# Patient Record
Sex: FEMALE | Race: WHITE
[De-identification: names, ages, dates, MRNs, and addresses within clinical notes are randomized per-mention and may not be internally consistent; named-entity substitution may affect disease eponyms.]

---

## 2018-06-26 ENCOUNTER — HOSPITAL ENCOUNTER (OUTPATIENT)
Dept: HOSPITAL 76 - DI | Age: 58
Discharge: HOME | End: 2018-06-26
Attending: OBSTETRICS & GYNECOLOGY
Payer: SELF-PAY

## 2018-06-26 DIAGNOSIS — Z12.31: Primary | ICD-10-CM

## 2018-06-26 PROCEDURE — 77067 SCR MAMMO BI INCL CAD: CPT

## 2018-06-28 NOTE — MAMMOGRAPHY REPORT
Procedure Date:  06/26/2018   

Accession Number:  [id number] / [id number]                    

Procedure:  MIKY - Screening Mammo Dig Bilat CPT Code:  

 

FULL RESULT:

 

 

EXAM: Screening Mammo Dig Bilat

 

DATE: 6/26/2018 11:10 AM

 

CLINICAL HISTORY: 58-year-old for screening

 

TECHNIQUE: Bilateral CC and MLO views were obtained.

 

COMPARISON: 7/7/2016, 7/8/2015, 7/1/2014, 6/18/2013, 4/16/2012, 4/8/2011, 

4/2/2010

 

FINDINGS:

The breasts demonstrate scattered fibroglandular densities bilaterally. 

Punctate, typically benign calcifications are present. No suspicious 

masses, clustered microcalcifications, or regions of architectural 

distortion are identified.

 

IMPRESSION: Benign findings

 

RECOMMENDATION: Routine annual screening unless otherwise clinically 

indicated.

 

BIRADS CATEGORY 2: Benign findings

 

STANDARD QUALIFYING STATEMENTS:

1.  This examination was reviewed with the aid of Computer-Aided 

Detection (CAD).

2.  A negative or benign  imaging report should not delay biopsy if 

clinically suspicious findings are present.  Consider surgical 

consultation if warrented.  More than 5% of cancers are not identified by 

imaging.

3.  Dense breasts may obscure an underlying neoplasm.

## 2018-07-10 ENCOUNTER — HOSPITAL ENCOUNTER (OUTPATIENT)
Dept: HOSPITAL 76 - SDS | Age: 58
Discharge: HOME | End: 2018-07-10
Attending: SURGERY
Payer: SELF-PAY

## 2018-07-10 VITALS — SYSTOLIC BLOOD PRESSURE: 102 MMHG | DIASTOLIC BLOOD PRESSURE: 62 MMHG

## 2018-07-10 DIAGNOSIS — K90.0: ICD-10-CM

## 2018-07-10 DIAGNOSIS — Z12.11: Primary | ICD-10-CM

## 2018-07-10 DIAGNOSIS — D12.2: ICD-10-CM

## 2018-07-10 PROCEDURE — 45380 COLONOSCOPY AND BIOPSY: CPT

## 2018-07-10 PROCEDURE — 0DBK8ZZ EXCISION OF ASCENDING COLON, VIA NATURAL OR ARTIFICIAL OPENING ENDOSCOPIC: ICD-10-PCS | Performed by: SURGERY

## 2020-01-03 ENCOUNTER — HOSPITAL ENCOUNTER (OUTPATIENT)
Dept: HOSPITAL 76 - DI | Age: 60
Discharge: HOME | End: 2020-01-03
Attending: GENERAL ACUTE CARE HOSPITAL
Payer: SELF-PAY

## 2020-01-03 DIAGNOSIS — Z12.31: Primary | ICD-10-CM

## 2020-01-03 DIAGNOSIS — Z80.3: ICD-10-CM

## 2020-01-03 PROCEDURE — 77067 SCR MAMMO BI INCL CAD: CPT

## 2020-01-08 NOTE — MAMMOGRAPHY REPORT
Reason:  ROUTINE SCREENING

Procedure Date:  01/03/2020   

Accession Number:  [id number] / [id number]                    

Procedure:  MIKY - Screening Mammo Dig Bilat CPT Code:  

 

***Final Report***

 

 

FULL RESULT:

 

 

EXAM: Screening Mammo Dig Bilat

 

DATE: 1/3/2020 4:26 PM

 

CLINICAL HISTORY:  The patient is an asymptomatic 59-year-old female. 

Second degree family history of breast cancer.

 

TECHNIQUE: (B) - Bilateral  CC, laterally exaggerated CC, MLO views were 

obtained.

 

COMPARISON: 6/26/2018, 7/2/2016, 7/8/2015, 7/1/2014, 6/18/2013 4/16/2012

 

PARENCHYMAL PATTERN: (A) - The breasts demonstrate scattered 

fibroglandular densities bilaterally.

 

FINDINGS:

The pattern of asymmetry is stable given positional variation and 

interval involution. There are no suspicious masses, calcifications, or 

areas of distortion.

 

IMPRESSION: Negative examination. BI-RADS category 1.

 

RECOMMENDATION: (ANNUAL)  - Recommend routine annual screening 

mammography.

 

BI-RADS CATEGORY: (1) - Negative.

 

STANDARD QUALIFYING STATEMENTS:

1.  This examination was not reviewed with the aid of Computer-Aided 

Detection (CAD).

2.  A negative or benign  imaging report should not preclude biopsy if 

clinically suspicious findings are present.

3.  Dense breasts may obscure an underlying neoplasm.

4. This examination was reviewed the aid of 3D breast imaging 

(tomosynthesis).

## 2021-01-19 ENCOUNTER — HOSPITAL ENCOUNTER (OUTPATIENT)
Dept: HOSPITAL 76 - DI.N | Age: 61
Discharge: HOME | End: 2021-01-19
Attending: OBSTETRICS & GYNECOLOGY
Payer: SELF-PAY

## 2021-01-19 DIAGNOSIS — Z12.31: Primary | ICD-10-CM

## 2021-01-20 NOTE — MAMMOGRAPHY REPORT
BILATERAL DIGITAL SCREENING MAMMOGRAM 3D/2D: 1/19/2021

 

CLINICAL: Routine screening.  

 

Comparison is made to exams dated:  1/3/2020 mammogram, 6/26/2018 mammogram, and 7/7/2016 mammogram -
 [facility].  The tissue of both breasts is heterogeneously dense. This may lower t
he sensitivity of mammography.  

 

No significant masses, calcifications, or other findings are seen in either breast.  

There has been no significant interval change.

 

IMPRESSION: NEGATIVE

There is no mammographic evidence of malignancy. A 1 year screening mammogram is recommended.  

 

 

This exam was interpreted at Station ID: [id number].  

 

NOTE: For mammograms, a report in lay terms will be sent to the patient. Approximately 15% of breast 
malignancies will not be visualized mammographically. In the management of a palpable breast mass, a 
negative mammogram must not discourage biopsy of a clinically suspicious lesion.

 

Electronically Signed By: Aung Sahni M.D.          

ar/rigoberto:1/19/2021 11:57:04  

 

 

 

ACR BI-RADS Category 1: Negative 3341F

PARENCHYMAL PATTERN: (D) - The breast(s) demonstrate(s) heterogeneously dense fibroglandular tianna lackey.

BI-RADS CATEGORY: (1) - 1

RECOMMENDATION: (ANNUAL)  - Recommend routine annual screening mammography.

[id number]

1 year screening

LATERALITY: (B)

## 2021-01-22 ENCOUNTER — HOSPITAL ENCOUNTER (OUTPATIENT)
Dept: HOSPITAL 76 - DI | Age: 61
Discharge: HOME | End: 2021-01-22
Attending: OBSTETRICS & GYNECOLOGY
Payer: SELF-PAY

## 2021-01-22 DIAGNOSIS — Z13.820: Primary | ICD-10-CM

## 2021-01-22 DIAGNOSIS — M85.89: ICD-10-CM

## 2021-01-22 NOTE — DEXA REPORT
PROCEDURE: Dexa Spine and/or Hip

 

INDICATIONS: SCREENING FOR OSTEOPOROSIS

 

TECHNIQUE: Dual energy x-ray absorptiometry (DXA) was performed on a [email or web address] System.  Regions measur
ed are the AP Spine, femoral neck, and if needed forearm.

 

COMPARISON: None.

  

FINDINGS:

 

Lumbar Spine: 

Bone Mineral Density   0.997 g/cm/cm,T score  -1.5, mild osteopenia

 

Left Hip:

Bone Mineral Density  0.727 g/cm/cm,T score -2.2, severe osteopenia

 

Left Femoral Neck:

Bone Mineral Density  0.766 g/cm/cm, T score -2.0, moderate to severe osteopenia

 

(T score greater or equal to -1.0: NORMAL)

(T score from -1.1 to -2.4: OSTEOPENIA)

(T score less than or equal to -2.5 to: OSTEOPOROSIS)

 

Impression: Osteopenia most severe in the left hip as above.

 

Patients with diagnosis of osteoporosis or osteopenia should have regular bone mineral density assess
ment.  For those eligible for Medicare, routine testing is allowed once every 2 years.  Testing frequ
ency can be increased for patients who have rapidly progressing disease or for those who are receivin
g medical therapy to restore bone mass.

 

Reviewed by: Mandy Osborne MD on 1/22/2021 2:38 PM PST

Approved by: Mandy Osborne MD on 1/22/2021 2:38 PM PST

 

 

Station ID:  [id number]

## 2022-05-02 ENCOUNTER — HOSPITAL ENCOUNTER (OUTPATIENT)
Dept: HOSPITAL 76 - LAB.N | Age: 62
Discharge: HOME | End: 2022-05-02
Attending: PHYSICIAN ASSISTANT
Payer: SELF-PAY

## 2022-05-02 DIAGNOSIS — J34.89: Primary | ICD-10-CM

## 2022-05-02 DIAGNOSIS — Z20.822: ICD-10-CM

## 2022-10-20 ENCOUNTER — HOSPITAL ENCOUNTER (OUTPATIENT)
Dept: HOSPITAL 76 - DI.N | Age: 62
Discharge: HOME | End: 2022-10-20
Attending: NURSE PRACTITIONER
Payer: SELF-PAY

## 2022-10-20 DIAGNOSIS — Z12.31: Primary | ICD-10-CM

## 2022-10-21 NOTE — MAMMOGRAPHY REPORT
BILATERAL DIGITAL SCREENING MAMMOGRAM 3D/2D: 10/20/2022

 

CLINICAL: Routine screening.  

 

Comparison is made to exams dated:  1/19/2021 mammogram, 1/3/2020 mammogram, 6/26/2018 mammogram, 7/7
/2016 mammogram, 7/8/2015 mammogram, and 7/1/2014 mammogram - [facility].  

 

There are scattered areas of fibroglandular density in both breasts (category b / 25%-50% glandular t
issue).  

 

No significant masses, calcifications, or other findings are seen in either breast.  

There has been no significant interval change.

 

IMPRESSION: NEGATIVE

There is no mammographic evidence of malignancy. A 1 year screening mammogram is recommended.  

 

Based on the Tyrer Cuzick model (a risk assessment model) the patients lifetime risk is 8.3% and her
 10 year risk is 3.8%. According to the ACR, ACS, and NCCN guidelines, an annual breast MRI exam loki
g with mammogram is recommended if the patients lifetime risk is 20% or greater.

 

 

This exam was interpreted at Station ID: [id number].  

 

NOTE: For mammograms, a report in lay terms will be sent to the patient. Approximately 15% of breast 
malignancies will not be visualized mammographically. In the management of a palpable breast mass, a 
negative mammogram must not discourage biopsy of a clinically suspicious lesion.

 

Electronically Signed By: Peri cunningham/rigoberto:10/20/2022 21:34:44  

 

 

 

ACR BI-RADS Category 1: Negative 3341F

PARENCHYMAL PATTERN: (A) - The breast(s) demonstrate(s) scattered fibroglandular densities.

BI-RADS CATEGORY: (1) - 1

RECOMMENDATION: (ANNUAL)  - Recommend routine annual screening mammography.

[id number]

1 year screening

LATERALITY: (B)

## 2023-10-24 ENCOUNTER — HOSPITAL ENCOUNTER (OUTPATIENT)
Dept: HOSPITAL 76 - DI.N | Age: 63
Discharge: HOME | End: 2023-10-24
Attending: NURSE PRACTITIONER
Payer: SELF-PAY

## 2023-10-24 DIAGNOSIS — R92.323: ICD-10-CM

## 2023-10-24 DIAGNOSIS — Z12.31: Primary | ICD-10-CM

## 2023-10-25 NOTE — MAMMOGRAPHY REPORT
BILATERAL DIGITAL SCREENING MAMMOGRAM 3D/2D: 10/24/2023

 

CLINICAL: Routine screening.  

 

Comparison is made to exams dated:  10/20/2022 mammogram, 1/19/2021 mammogram, 1/3/2020 mammogram, 6/
26/2018 mammogram, 7/7/2016 mammogram, and 7/8/2015 mammogram - [facility].  

 

There are scattered areas of fibroglandular density in both breasts (category b / 25%-50% glandular t
issue).  

 

There is a benign focal asymmetry in the right breast.  

No significant masses, calcifications, or other findings are seen in either breast.  

There has been no significant interval change.

 

IMPRESSION: BENIGN

There is no mammographic evidence of malignancy. A 1 year screening mammogram is recommended.  

 

Based on the Tyrer Cuzick model (a risk assessment model) the patients lifetime risk is 7.9% and her
 10 year risk is 3.8%. According to the ACR, ACS, and NCCN guidelines, an annual breast MRI exam loki
g with mammogram is recommended if the patients lifetime risk is 20% or greater.

 

 

This exam was interpreted at Station ID: [id number].  

 

NOTE: For mammograms, a report in lay terms will be sent to the patient. Approximately 15% of breast 
malignancies will not be visualized mammographically. In the management of a palpable breast mass, a 
negative mammogram must not discourage biopsy of a clinically suspicious lesion.

 

Electronically Signed By: Chidi barker/rigoberto:10/24/2023 17:36:09  

 

 

letter sent: No_Letter  

ACR BI-RADS Category 2: Benign Finding(s) 3342F

PARENCHYMAL PATTERN: (A) - The breast(s) demonstrate(s) scattered fibroglandular densities.

BI-RADS CATEGORY: (2) - 2

Mammogram

[id number]

1 year screening

LATERALITY: (B)

## 2024-09-26 ENCOUNTER — HOSPITAL ENCOUNTER (OUTPATIENT)
Dept: HOSPITAL 76 - DI | Age: 64
Discharge: HOME | End: 2024-09-26
Attending: NURSE PRACTITIONER
Payer: SELF-PAY

## 2024-09-26 DIAGNOSIS — K76.9: Primary | ICD-10-CM

## 2024-09-26 DIAGNOSIS — R12: ICD-10-CM

## 2024-09-26 PROCEDURE — 74150 CT ABDOMEN W/O CONTRAST: CPT

## 2024-09-26 RX ADMIN — DIATRIZOATE MEGLUMINE AND DIATRIZOATE SODIUM ONE ML: 600; 100 SOLUTION ORAL; RECTAL at 10:31

## 2024-09-27 NOTE — CT REPORT
PROCEDURE:  Abdomen WO

 

INDICATIONS:  HEARTBURN, EPIGASTRIC HERNIA

 

CONTRAST: Noncontrast study

 

TECHNIQUE:  

After the administration of oral contrast, 5 mm thick sections acquired from the diaphragms to the il
iac crests.  5 mm coronal and sagittal reformats were then performed.  For radiation dose reduction, 
the following was used:  automated exposure control, adjustment of mA and/or kV according to patient 
size.

 

COMPARISON:  None

 

FINDINGS:  

Image quality: Diagnostic

 

Lower chest: Mucous plugging and atelectasis in the middle lobe and lingula. Atelectasis also seen in
 the left lower lung.

A possible small sliding hiatal hernia is present on this nondynamic study. Possible coronary calcifi
cations partially seen

 

Liver: Hypoattenuating liver lesions are seen, possibly cysts or hemangiomas, overall indeterminate, 
most notably at the liver dome measuring 1.3 cm. (2/29).

Solid organs otherwise not well evaluated without IV contrast

Gallbladder and biliary system: Unremarkable, nondilated

Pancreas: No ductal dilation

Spleen: Nonenlarged

Adrenals: No discrete nodules

Kidneys: Possible parapelvic cysts. No obstructing calcified stone is seen

No contour deforming solid mass

 

Vessels and lymph nodes: No abdominal aortic aneurysm or pathologic lymph nodes by size criteria.

 

Bowel and peritoneum: No small bowel obstruction. No pathologic ascites

 

Body wall: Unremarkable

 

Bones: No acute or suspicious osseous finding.

 

IMPRESSION:

There is a possible small sliding hiatal hernia. If further evaluation is needed, consider endoscopy 
or esophagram.

No small bowel obstruction.

 

There are numerous indeterminate liver lesions, which may represent cysts or hemangiomas. However con
 liver MRI for confirmation, especially if the patient has any risk factors or history of malign
rebeca.

 

Other findings above.

 

Reviewed by: Pernell Alonso MD on 9/27/2024 9:51 AM PDT

Approved by: Pernell Alonso MD on 9/27/2024 9:51 AM PDT

 

 

Station ID:  [id number]